# Patient Record
Sex: MALE | Race: BLACK OR AFRICAN AMERICAN | NOT HISPANIC OR LATINO
[De-identification: names, ages, dates, MRNs, and addresses within clinical notes are randomized per-mention and may not be internally consistent; named-entity substitution may affect disease eponyms.]

---

## 2018-03-06 PROBLEM — Z00.00 ENCOUNTER FOR PREVENTIVE HEALTH EXAMINATION: Status: ACTIVE | Noted: 2018-03-06

## 2018-04-20 ENCOUNTER — APPOINTMENT (OUTPATIENT)
Dept: UROLOGY | Facility: CLINIC | Age: 43
End: 2018-04-20
Payer: COMMERCIAL

## 2018-04-20 VITALS — DIASTOLIC BLOOD PRESSURE: 81 MMHG | HEART RATE: 82 BPM | TEMPERATURE: 98.8 F | SYSTOLIC BLOOD PRESSURE: 126 MMHG

## 2018-04-20 PROCEDURE — 99204 OFFICE O/P NEW MOD 45 MIN: CPT

## 2018-04-20 RX ORDER — AMLODIPINE BESYLATE AND BENAZEPRIL HYDROCHLORIDE 10; 20 MG/1; MG/1
10-20 CAPSULE ORAL
Qty: 30 | Refills: 0 | Status: ACTIVE | COMMUNITY
Start: 2017-11-11

## 2018-04-20 RX ORDER — NAPROXEN 500 MG/1
500 TABLET ORAL
Qty: 14 | Refills: 0 | Status: ACTIVE | COMMUNITY
Start: 2018-01-10

## 2018-04-20 RX ORDER — NEBIVOLOL HYDROCHLORIDE 5 MG/1
5 TABLET ORAL
Qty: 30 | Refills: 0 | Status: ACTIVE | COMMUNITY
Start: 2017-11-11

## 2018-04-20 RX ORDER — OMEPRAZOLE 40 MG/1
40 CAPSULE, DELAYED RELEASE ORAL
Qty: 30 | Refills: 0 | Status: ACTIVE | COMMUNITY
Start: 2017-11-11

## 2018-04-20 RX ORDER — TADALAFIL 20 MG/1
20 TABLET, FILM COATED ORAL
Qty: 6 | Refills: 0 | Status: ACTIVE | COMMUNITY
Start: 2017-11-11

## 2018-04-20 RX ORDER — DOXYCYCLINE 100 MG/1
100 CAPSULE ORAL
Qty: 60 | Refills: 0 | Status: ACTIVE | COMMUNITY
Start: 2017-08-07

## 2018-04-20 RX ORDER — ALBUTEROL SULFATE 90 UG/1
108 (90 BASE) AEROSOL, METERED RESPIRATORY (INHALATION)
Qty: 18 | Refills: 0 | Status: ACTIVE | COMMUNITY
Start: 2017-11-11

## 2018-04-23 LAB
APPEARANCE: ABNORMAL
BACTERIA UR CULT: NORMAL
BACTERIA: NEGATIVE
BILIRUBIN URINE: NEGATIVE
BLOOD URINE: NEGATIVE
COLOR: YELLOW
GLUCOSE QUALITATIVE U: NEGATIVE MG/DL
HYALINE CASTS: 1 /LPF
KETONES URINE: NEGATIVE
LEUKOCYTE ESTERASE URINE: NEGATIVE
MICROSCOPIC-UA: NORMAL
NITRITE URINE: NEGATIVE
PH URINE: 5
PROTEIN URINE: NEGATIVE MG/DL
RED BLOOD CELLS URINE: 0 /HPF
SPECIFIC GRAVITY URINE: 1.02
SQUAMOUS EPITHELIAL CELLS: 0 /HPF
UROBILINOGEN URINE: NEGATIVE MG/DL
WHITE BLOOD CELLS URINE: 1 /HPF

## 2018-05-02 LAB — CORE LAB NON GYN CYTOLOGY TO LENOX HILL: NORMAL

## 2018-05-25 ENCOUNTER — APPOINTMENT (OUTPATIENT)
Dept: UROLOGY | Facility: CLINIC | Age: 43
End: 2018-05-25

## 2018-09-06 ENCOUNTER — MOBILE ON CALL (OUTPATIENT)
Age: 43
End: 2018-09-06

## 2022-11-30 ENCOUNTER — APPOINTMENT (OUTPATIENT)
Dept: NEPHROLOGY | Facility: CLINIC | Age: 47
End: 2022-11-30

## 2022-11-30 VITALS
WEIGHT: 280 LBS | DIASTOLIC BLOOD PRESSURE: 98 MMHG | BODY MASS INDEX: 40.09 KG/M2 | HEIGHT: 70 IN | SYSTOLIC BLOOD PRESSURE: 146 MMHG | HEART RATE: 80 BPM

## 2022-11-30 DIAGNOSIS — R53.83 OTHER FATIGUE: ICD-10-CM

## 2022-11-30 PROCEDURE — 99205 OFFICE O/P NEW HI 60 MIN: CPT

## 2022-11-30 RX ORDER — AMLODIPINE BESYLATE 10 MG/1
10 TABLET ORAL DAILY
Qty: 30 | Refills: 5 | Status: ACTIVE | COMMUNITY
Start: 2022-11-30 | End: 1900-01-01

## 2022-11-30 NOTE — HISTORY OF PRESENT ILLNESS
[FreeTextEntry1] : 47-year-old man referred by Dr. Estrada with declining kidney function over the last 4 years.  His creatinine was 1.23 in 2018, 1.07 in 2020, but 1.67 in June 2022, and 2.1 in September with a BUN of 29, GFR 44, hemoglobin 13.5, calcium 10.9, globulin 4.5, total protein 9.7, K4.1.  He has been hypertensive with home readings that run about 145/95 on average, despite amlodipine 5 mg daily and hydrochlorothiazide 25 mg daily.  He has not used NSAIDs.  He has not been exposed to radiocontrast.  He has GERD, but is not on a PPI, only famotidine.

## 2022-11-30 NOTE — ASSESSMENT
[FreeTextEntry1] : 47-year-old man referred because of declining renal function -his creatinine has increased up to 2.1 in September, and his home BP as well as today's office BP are elevated.  I have asked him to increase amlodipine to 10 mg daily, and I am adding Edarbi chlor 40/25 mg daily, and replacing HCTZ with that.  He will remain off Bystolic.  In view of his hypercalcemia and hyperglobulinemia, I am planning to screen for multiple myeloma or some form of light chain nephropathy.  I have ordered a renal ultrasound to assess kidney size, echogenicity, and rule out obstructive uropathy.  I have ordered labs to include U ACR, BMP, Cystatin C, PTH, phosphorus, serum protein electrophoresis, double-stranded DNA, testosterone level.  He will return in less than 2 weeks and will bring his home BP cuff to assess its accuracy. Time spent 50 min

## 2022-11-30 NOTE — CONSULT LETTER
[Dear  ___] : Dear  [unfilled], [Consult Letter:] : I had the pleasure of evaluating your patient, [unfilled]. [Please see my note below.] : Please see my note below. [Consult Closing:] : Thank you very much for allowing me to participate in the care of this patient.  If you have any questions, please do not hesitate to contact me. [Sincerely,] : Sincerely, [FreeTextEntry2] : Dr Estrada [FreeTextEntry3] : Sincerely, \par \par Jered Marquez MD, FACP\par

## 2022-12-13 ENCOUNTER — APPOINTMENT (OUTPATIENT)
Dept: NEPHROLOGY | Facility: CLINIC | Age: 47
End: 2022-12-13

## 2022-12-13 VITALS
WEIGHT: 280 LBS | HEIGHT: 70 IN | DIASTOLIC BLOOD PRESSURE: 78 MMHG | BODY MASS INDEX: 40.09 KG/M2 | SYSTOLIC BLOOD PRESSURE: 107 MMHG

## 2022-12-13 DIAGNOSIS — R31.0 GROSS HEMATURIA: ICD-10-CM

## 2022-12-13 PROCEDURE — 99443: CPT

## 2022-12-13 RX ORDER — AZILSARTAN KAMEDOXOMIL AND CHLORTHALIDONE 40; 25 MG/1; MG/1
40-25 TABLET ORAL
Qty: 30 | Refills: 0 | Status: ACTIVE | COMMUNITY
Start: 2022-12-13 | End: 1900-01-01

## 2022-12-13 RX ORDER — AZILSARTAN KAMEDOXOMIL AND CHLORTHALIDONE 40; 25 MG/1; MG/1
40-25 TABLET ORAL DAILY
Qty: 90 | Refills: 1 | Status: ACTIVE | COMMUNITY
Start: 2022-12-13 | End: 1900-01-01

## 2022-12-13 NOTE — HISTORY OF PRESENT ILLNESS
[Home] : at home, [unfilled] , at the time of the visit. [Medical Office: ([facility])___] : at the medical office located in  [Verbal consent obtained from patient] : the patient, [unfilled] [FreeTextEntry1] : 47-year-old man referred by Dr. Estrada with declining renal function.  His creatinine was 1.23 in 2018, and 1.07 in 2020, but edna to 1.67 in June of this year, then 2.1 in September with a GFR of 44, calcium 10.9, globulin 4.5, K4.1.  He is hypertensive, with home readings that were averaging around 145/95 despite amlodipine 5 mg daily and hydrochlorothiazide 25 mg daily.  There is no history of NSAID usage or radiocontrast exposure, or PPIs.  He takes famotidine for GERD.  His kidneys look normal in size and echogenicity on ultrasound.  He has no secondary hyperparathyroidism.  He has no microalbuminuria.  His latest creatinine is 1.39 with a BUN of 25 and a GFR up to 63!.  He has a sed rate of 56 with an SPEP suggesting chronic inflammation.  The etiology is not clear.  Double-stranded DNA is normal.  Hemoglobin is normal at 14.  His BP here on November 30 was 146/98.  I increased his amlodipine to 10 mg daily, stop hydrochlorothiazide, and started Edarbi chlor 40/25 mg daily.  He reports that his BP is dramatically better, currently 107/78 today without dizziness.  His edema has resolved.  He feels generally better.

## 2022-12-13 NOTE — ASSESSMENT
[FreeTextEntry1] : 47-year-old man with CKD which appears to have improved, hypertension which is dramatically better with the addition of Edarbi chlor.  I have asked him to reduce amlodipine back to 5 mg daily because of his systolic being under 110, and he will continue Edarbi chlor.  The etiology of his hyperglobulinemia and elevated sed rate is not clear yet.  I have sent a prescription for Edarbi chlor to August.   Time spent 21 minutes

## 2022-12-13 NOTE — PHYSICAL EXAM
[General Appearance - Alert] : alert [General Appearance - In No Acute Distress] : in no acute distress [Deep Tendon Reflexes (DTR)] : deep tendon reflexes were 2+ and symmetric [No Focal Deficits] : no focal deficits [Oriented To Time, Place, And Person] : oriented to person, place, and time [Impaired Insight] : insight and judgment were intact [Affect] : the affect was normal

## 2022-12-19 ENCOUNTER — APPOINTMENT (OUTPATIENT)
Dept: PULMONOLOGY | Facility: CLINIC | Age: 47
End: 2022-12-19

## 2023-04-03 ENCOUNTER — APPOINTMENT (OUTPATIENT)
Dept: NEPHROLOGY | Facility: CLINIC | Age: 48
End: 2023-04-03
Payer: COMMERCIAL

## 2023-04-03 VITALS
WEIGHT: 280 LBS | SYSTOLIC BLOOD PRESSURE: 108 MMHG | HEIGHT: 70 IN | DIASTOLIC BLOOD PRESSURE: 74 MMHG | BODY MASS INDEX: 40.09 KG/M2 | HEART RATE: 76 BPM

## 2023-04-03 DIAGNOSIS — E83.52 HYPERCALCEMIA: ICD-10-CM

## 2023-04-03 DIAGNOSIS — R77.1 ABNORMALITY OF GLOBULIN: ICD-10-CM

## 2023-04-03 PROCEDURE — 99214 OFFICE O/P EST MOD 30 MIN: CPT

## 2023-04-03 NOTE — HISTORY OF PRESENT ILLNESS
[FreeTextEntry1] : 47-year-old man with a history of uncontrolled hypertension despite a good regimen, and worsening kidney function.  His creatinine was 1.23 in 2018, 1.14 in 2019, 1.07 in 2020, but 1.67 in June 2022, then 2.1 in September, and now 1.87 with a BUN of 30, GFR 44, hemoglobin 14.2, K4.6, with no proteinuria.  His kidneys look normal on ultrasound in December.  He has leukocytosis, and an elevated sed rate with an SPEP suggesting chronic inflammation.  Double-stranded DNA was normal.  His BP was quite high when I first met him, and came down to normal with the addition of Edarbi chlor 40/12.5 mg daily.  He has not lost weight and is not dizzy or lightheaded.  His home BP is running in the 120s over 70s.  Edema has resolved.  He has complained of right upper quadrant discomfort and is scheduled for an ultrasound of the abdomen later this week.  He denies dyspnea or chest pain.  He went to the ER at [facility] several days ago, and says they did an ultrasound and found nothing.

## 2023-04-03 NOTE — ASSESSMENT
[FreeTextEntry1] : 47-year-old man with gradual worsening of renal function over the last year, in spite of normalization of previously elevated blood pressure.  There is no real evidence of volume depletion, such as weight loss, hypotension, dizziness etc.  I have asked him to increase his fluid intake however.  His kidneys look normal on ultrasound 3 months ago and there is another ultrasound pending later this week, presumably to look at his gallbladder -largely because of his right upper quadrant discomfort.  I have asked him to repeat labs prior to his next visit in 2 months, to include BMP, Cystatin C, urine microalbumin, SPEP, PTH.

## 2023-06-28 ENCOUNTER — APPOINTMENT (OUTPATIENT)
Dept: NEPHROLOGY | Facility: CLINIC | Age: 48
End: 2023-06-28
Payer: COMMERCIAL

## 2023-06-28 VITALS
HEART RATE: 72 BPM | SYSTOLIC BLOOD PRESSURE: 116 MMHG | BODY MASS INDEX: 40.09 KG/M2 | HEIGHT: 70 IN | DIASTOLIC BLOOD PRESSURE: 78 MMHG | WEIGHT: 280 LBS

## 2023-06-28 DIAGNOSIS — I10 ESSENTIAL (PRIMARY) HYPERTENSION: ICD-10-CM

## 2023-06-28 DIAGNOSIS — N18.32 CHRONIC KIDNEY DISEASE, STAGE 3B: ICD-10-CM

## 2023-06-28 DIAGNOSIS — E66.9 OBESITY, UNSPECIFIED: ICD-10-CM

## 2023-06-28 PROCEDURE — 99214 OFFICE O/P EST MOD 30 MIN: CPT

## 2023-06-28 NOTE — ASSESSMENT
[FreeTextEntry1] : 47-year-old man with well-controlled hypertension, who is renal function did worsen in the last 12 to 18 months -while currently stable, it is not clear what contributed to this other than NSAIDs and PPIs.  I have asked him to reduce the dose of chlorthalidone by changing the Edarbi chlor to 40/12.5 mg.  I have asked him to repeat labs in 4 months to include ACR, BMP, Cystatin C, PTH.  He will see Dr. Donte Rahman tomorrow, for clearance in regard to umbilical hernia surgery.  I think he is okay from a renal standpoint , but I do think that screening for TRAVIS is probably appropriate in the near future.

## 2023-06-28 NOTE — HISTORY OF PRESENT ILLNESS
[FreeTextEntry1] : 47-year-old man with a history of uncontrolled hypertension in the past, despite a good regimen and worsening renal function in the last year -referred by Dr. Estrada.  2-3  years ago, his creatinine was in the 1.2 range, but edna to about 1.7 one year ago.  It has since settled in the 1.9 range, with a BUN of 34, K4.6, CO2 28, hemoglobin 13.4, urinalysis showing no protein, and a normal renal ultrasound.  He has used naproxen and I have discouraged him from using that at all.  He previously took omeprazole, but stopped 6 months ago.  He has gained weight to the range of 280 pounds, and snores regularly, with apneic spells observed by his girlfriend and restless legs.  I suspect he may have obstructive sleep apnea.  I also wonder if he may be slightly volume depleted, as result of chlorthalidone 25 mg in his Edarbi chlor.

## 2023-11-28 ENCOUNTER — APPOINTMENT (OUTPATIENT)
Dept: NEPHROLOGY | Facility: CLINIC | Age: 48
End: 2023-11-28

## 2023-12-20 ENCOUNTER — RX RENEWAL (OUTPATIENT)
Age: 48
End: 2023-12-20

## 2023-12-20 RX ORDER — AZILSARTAN KAMEDOXOMIL AND CHLORTHALIDONE 40; 12.5 MG/1; MG/1
40-12.5 TABLET ORAL DAILY
Qty: 90 | Refills: 2 | Status: ACTIVE | COMMUNITY
Start: 2023-06-28 | End: 1900-01-01

## 2024-01-23 ENCOUNTER — APPOINTMENT (OUTPATIENT)
Dept: NEPHROLOGY | Facility: CLINIC | Age: 49
End: 2024-01-23

## 2024-02-12 ENCOUNTER — APPOINTMENT (OUTPATIENT)
Dept: NEPHROLOGY | Facility: CLINIC | Age: 49
End: 2024-02-12

## 2024-11-26 ENCOUNTER — RX RENEWAL (OUTPATIENT)
Age: 49
End: 2024-11-26

## 2025-04-21 ENCOUNTER — APPOINTMENT (OUTPATIENT)
Dept: NEPHROLOGY | Facility: CLINIC | Age: 50
End: 2025-04-21
Payer: COMMERCIAL

## 2025-04-21 VITALS
BODY MASS INDEX: 43.67 KG/M2 | HEIGHT: 70 IN | WEIGHT: 305 LBS | SYSTOLIC BLOOD PRESSURE: 120 MMHG | DIASTOLIC BLOOD PRESSURE: 70 MMHG

## 2025-04-21 DIAGNOSIS — D72.829 ELEVATED WHITE BLOOD CELL COUNT, UNSPECIFIED: ICD-10-CM

## 2025-04-21 DIAGNOSIS — I10 ESSENTIAL (PRIMARY) HYPERTENSION: ICD-10-CM

## 2025-04-21 DIAGNOSIS — N18.32 CHRONIC KIDNEY DISEASE, STAGE 3B: ICD-10-CM

## 2025-04-21 PROCEDURE — 99214 OFFICE O/P EST MOD 30 MIN: CPT | Mod: 3W

## 2025-05-14 ENCOUNTER — NON-APPOINTMENT (OUTPATIENT)
Age: 50
End: 2025-05-14

## 2025-05-15 RX ORDER — AZILSARTAN KAMEDOXOMIL AND CHLORTHALIDONE 40; 25 MG/1; MG/1
40-25 TABLET ORAL DAILY
Qty: 90 | Refills: 1 | Status: ACTIVE | COMMUNITY
Start: 2025-05-15 | End: 1900-01-01